# Patient Record
Sex: FEMALE | Race: WHITE
[De-identification: names, ages, dates, MRNs, and addresses within clinical notes are randomized per-mention and may not be internally consistent; named-entity substitution may affect disease eponyms.]

---

## 2022-12-05 ENCOUNTER — HOSPITAL ENCOUNTER (OUTPATIENT)
Dept: HOSPITAL 47 - RADUSWWP | Age: 22
Discharge: HOME | End: 2022-12-05
Attending: INTERNAL MEDICINE
Payer: COMMERCIAL

## 2022-12-05 DIAGNOSIS — R10.31: Primary | ICD-10-CM

## 2022-12-05 PROCEDURE — 74248 X-RAY SM INT F-THRU STD: CPT

## 2022-12-05 PROCEDURE — 74240 X-RAY XM UPR GI TRC 1CNTRST: CPT

## 2022-12-05 NOTE — FL
EXAMINATION TYPE: FL UGI w small bowel

 

DATE OF EXAM: 12/5/2022 11:33 AM

 

CLINICAL INDICATION:Female, 22 years old with history of R10.31 ABD PAIN;

 

COMPARISON: None

 

TECHNIQUE: The procedure was explained and patient history elicited.   All patient questions were ans
wered prior to start of procedure. A  radiograph of the abdomen was also reviewed.  Multiple flu
oroscopic spot images of the esophagus, stomach and duodenum were obtained following ingestion of liq
uid barium and EZ-gas crystals.  

Fluoroscopic time: 1 minute 15 seconds 

Fluoroscopic images: 3

Radiographs taken: 3

 

 

FINDINGS: 

 

Upper GI examination:

The  abdominal radiograph demonstrates a normal bowel gas pattern without dilated loops of small
 or large bowel.   There is no evidence for organomegaly or pneumoperitoneum.  No abnormal calcificat
ions. The visualized osseous structures are intact.

 

Real-time imaging of the esophagus demonstrate no focal stricture, ulceration or abnormal outpouching
.   No hiatal hernia was visualized.  No evidence of gastroesophageal reflux was seen. The stomach an
d duodenum demonstrate a normal course and contour.  There is no evidence of focal gastric or duodena
l ulceration, stricture, or abnormal outpouching.  

 

Contrast is seen extending from the duodenojejunal junction into the cecum after 30 minutes, which is
 within the expected time period.  The small bowel follows normal distribution and contour without an
y evidence of extraluminal or intraluminal irregularity.  There is no displacement of bowel loops or 
extraluminal extravasation of contrast material.

 

Manual spot imaging at the end of 30 minutes for terminal ileum was limited secondary to overlapping 
small bowel. 

 

IMPRESSION:

1. Normal upper gastrointestinal examination.

2. Normal detailed small bowel examination.